# Patient Record
Sex: FEMALE | Race: AMERICAN INDIAN OR ALASKA NATIVE | NOT HISPANIC OR LATINO | Employment: UNEMPLOYED | URBAN - METROPOLITAN AREA
[De-identification: names, ages, dates, MRNs, and addresses within clinical notes are randomized per-mention and may not be internally consistent; named-entity substitution may affect disease eponyms.]

---

## 2018-12-07 ENCOUNTER — HOSPITAL ENCOUNTER (EMERGENCY)
Facility: MEDICAL CENTER | Age: 38
End: 2018-12-07
Attending: EMERGENCY MEDICINE

## 2018-12-07 ENCOUNTER — APPOINTMENT (OUTPATIENT)
Dept: RADIOLOGY | Facility: MEDICAL CENTER | Age: 38
End: 2018-12-07
Attending: EMERGENCY MEDICINE

## 2018-12-07 VITALS
HEART RATE: 90 BPM | SYSTOLIC BLOOD PRESSURE: 153 MMHG | OXYGEN SATURATION: 99 % | BODY MASS INDEX: 38.61 KG/M2 | TEMPERATURE: 96.4 F | RESPIRATION RATE: 16 BRPM | DIASTOLIC BLOOD PRESSURE: 91 MMHG | WEIGHT: 239.2 LBS

## 2018-12-07 DIAGNOSIS — S62.605A CLOSED DISPLACED FRACTURE OF PHALANX OF LEFT RING FINGER, UNSPECIFIED PHALANX, INITIAL ENCOUNTER: ICD-10-CM

## 2018-12-07 PROCEDURE — 73140 X-RAY EXAM OF FINGER(S): CPT | Mod: LT

## 2018-12-07 PROCEDURE — 99284 EMERGENCY DEPT VISIT MOD MDM: CPT

## 2018-12-07 ASSESSMENT — PAIN DESCRIPTION - DESCRIPTORS: DESCRIPTORS: SHARP;THROBBING

## 2018-12-07 NOTE — ED NOTES
"PT refused to take any d/c instructions, refused to take resource paperwork provided by .  PT ambulated down [address] yelling \"if I end up in a ditch tonight with a frozen brain it will be all your fault, I hope your happy.\"    "

## 2018-12-07 NOTE — ED TRIAGE NOTES
.  Chief Complaint   Patient presents with   • Digit Pain     left 4th finger swelling x 2 weeks     Pt reports fracture to left finger. Imaging done while pt in penitentiary. Pt reports being released this am.

## 2018-12-07 NOTE — ED PROVIDER NOTES
ED Provider Note    CHIEF COMPLAINT  Chief Complaint   Patient presents with   • Digit Pain     left 4th finger swelling x 2 weeks       HPI  Danya Hummel is a 38 y.o. female who presents complaining of finger pain.  She injured it while she was being arrested apparently 2 weeks ago.  An x-ray was obtained 1 week later which showed a fracture.  She has tried a splint and neo taping.  She was incarcerated for the entire 2 weeks, was just released today, she did not know where else to go so she presents here.    CURRENT MEDICATIONS  Home Medications     Reviewed by Angeline Low R.N. (Registered Nurse) on 12/07/18 at 1043  Med List Status: Complete   Medication Last Dose Status        Patient Floyd Taking any Medications                       I have reviewed the nurses notes and/or the list brought with the patient.    ALLERGIES  No Known Allergies    REVIEW OF SYSTEMS  See HPI for further details. Review of systems as above, finger pain    PHYSICAL EXAM  VITAL SIGNS: /113   Pulse (!) 117   Temp (!) 35.8 °C (96.4 °F) (Temporal)   Resp 18   Wt 108.5 kg (239 lb 3.2 oz)   LMP 11/15/2018   SpO2 99%   BMI 38.61 kg/m²     Constitutional: Well appearing patient in no acute distress.  Not toxic, nor ill in appearance.  Extremities/Musculoskeletal: There is deformity of the ring finger on the left hand.  It is slightly swollen.  There is no erythema or warmth.  It is not tender.      RADIOLOGY/PROCEDURES  I have reviewed the patient's film interpretations myself, and they are read out by the radiologist as:   DX-FINGER(S) 2+ LEFT   Final Result      1.  Mildly displaced oblique intra-articular fracture of the LEFT 4th middle phalanx involving the distal interphalangeal joint with probable mild rotational deformity and associated soft tissue swelling.   2.  No dislocation.   3.  Flexion at the 4th distal interphalangeal joint noted.        .    MEDICAL RECORD  I have reviewed patient's medical record  and pertinent results are listed above.    COURSE & MEDICAL DECISION MAKING  I have reviewed any medical record information, laboratory studies and radiographic results as noted above.  Patient presents with a fracture 2 weeks old.  At this point, we will place her into a splint.  She should follow-up with hand surgery for definitive care.  Referral for Dr. Singh who is on-call for us presently for hand surgery.  Instructions on finger fracture.  I also had social work speak with her.      FINAL IMPRESSION  1. Closed displaced fracture of phalanx of left ring finger, unspecified phalanx, initial encounter           This dictation was created using voice recognition software.    Electronically signed by: Pasquale Hua, 12/7/2018 11:48 AM

## 2018-12-07 NOTE — ED NOTES
"PT upset about being discharged back to the streets, pt told tech that \"my family wont take me back, im going to be on the streets\"  This RN updated pt on resources that  provided.  PT then stated \"whell then what about my hip\" .  PT ambulatory to restroom multiple times during this visit.  MD updated about hip pain, pt to follow up with PCP and ortho   "

## 2022-07-15 PROBLEM — A41.9 SEPSIS (HCC): Status: ACTIVE | Noted: 2022-07-15

## 2022-07-15 PROBLEM — S76.301A RIGHT HAMSTRING INJURY: Status: ACTIVE | Noted: 2022-07-15

## 2022-07-15 PROBLEM — Z78.9 FULL CODE STATUS: Status: ACTIVE | Noted: 2022-07-15

## 2022-07-15 PROBLEM — E87.6 HYPOKALEMIA DUE TO EXCESSIVE GASTROINTESTINAL LOSS OF POTASSIUM: Status: ACTIVE | Noted: 2022-07-15

## 2022-07-15 PROBLEM — E80.6 HYPERBILIRUBINEMIA: Status: ACTIVE | Noted: 2022-07-15

## 2022-07-15 PROBLEM — N17.9 ACUTE KIDNEY INJURY (HCC): Status: ACTIVE | Noted: 2022-07-15

## 2022-07-15 PROBLEM — K21.9 GASTROESOPHAGEAL REFLUX DISEASE WITHOUT ESOPHAGITIS: Status: ACTIVE | Noted: 2022-07-15

## 2022-07-15 PROBLEM — E87.1 HYPONATREMIA: Status: ACTIVE | Noted: 2022-07-15

## 2022-07-15 PROBLEM — J18.9 COMMUNITY ACQUIRED BILATERAL LOWER LOBE PNEUMONIA: Status: ACTIVE | Noted: 2022-07-15

## 2022-07-15 PROBLEM — R65.20 SEVERE SEPSIS WITH ACUTE ORGAN DYSFUNCTION (HCC): Status: ACTIVE | Noted: 2022-07-15

## 2022-07-18 PROBLEM — R65.20 SEVERE SEPSIS WITH ACUTE ORGAN DYSFUNCTION (HCC): Status: RESOLVED | Noted: 2022-07-15 | Resolved: 2022-07-18

## 2022-07-18 PROBLEM — A41.9 SEVERE SEPSIS WITH ACUTE ORGAN DYSFUNCTION (HCC): Status: RESOLVED | Noted: 2022-07-15 | Resolved: 2022-07-18
